# Patient Record
Sex: FEMALE | Race: WHITE
[De-identification: names, ages, dates, MRNs, and addresses within clinical notes are randomized per-mention and may not be internally consistent; named-entity substitution may affect disease eponyms.]

---

## 2018-01-22 ENCOUNTER — HOSPITAL ENCOUNTER (INPATIENT)
Dept: HOSPITAL 68 - SDA | Age: 73
LOS: 2 days | Discharge: HOME HEALTH | DRG: 470 | End: 2018-01-24
Attending: ORTHOPAEDIC SURGERY | Admitting: ORTHOPAEDIC SURGERY
Payer: COMMERCIAL

## 2018-01-22 VITALS — DIASTOLIC BLOOD PRESSURE: 56 MMHG | SYSTOLIC BLOOD PRESSURE: 114 MMHG

## 2018-01-22 VITALS — WEIGHT: 170 LBS | BODY MASS INDEX: 28.32 KG/M2 | HEIGHT: 65 IN

## 2018-01-22 VITALS — SYSTOLIC BLOOD PRESSURE: 128 MMHG | DIASTOLIC BLOOD PRESSURE: 68 MMHG

## 2018-01-22 VITALS — DIASTOLIC BLOOD PRESSURE: 62 MMHG | SYSTOLIC BLOOD PRESSURE: 112 MMHG

## 2018-01-22 VITALS — SYSTOLIC BLOOD PRESSURE: 100 MMHG | DIASTOLIC BLOOD PRESSURE: 60 MMHG

## 2018-01-22 VITALS — DIASTOLIC BLOOD PRESSURE: 54 MMHG | SYSTOLIC BLOOD PRESSURE: 104 MMHG

## 2018-01-22 DIAGNOSIS — I48.91: ICD-10-CM

## 2018-01-22 DIAGNOSIS — K21.9: ICD-10-CM

## 2018-01-22 DIAGNOSIS — I10: ICD-10-CM

## 2018-01-22 DIAGNOSIS — M16.11: Primary | ICD-10-CM

## 2018-01-22 PROCEDURE — 2NBSP: CPT

## 2018-01-22 PROCEDURE — 0SR904A REPLACEMENT OF RIGHT HIP JOINT WITH CERAMIC ON POLYETHYLENE SYNTHETIC SUBSTITUTE, UNCEMENTED, OPEN APPROACH: ICD-10-PCS | Performed by: ORTHOPAEDIC SURGERY

## 2018-01-22 NOTE — SURGICAL DISCHARGE SUMMARY
Visit Information
 
Visit Dates
Admission Date:
01/22/18
 
Discharge Date:
1/24/18
 
 
History of Present Illness
Chief Complaint:
RIGHT HIP PAIN
 
Surgical History
Pertinent Surgical History: unobtainable
Review of Systems:
OK
 
Hospital Course
 
Course
Attending Physician:
Jean Claude Hernandez MD
 
Primary Care Physician:
Edison MORFIN,Qiana KAMINSKI
 
Hospital Course:
PT HAD A RIGHT TOTAL HIP REPLACEMENT ON 1/22/18 BY DR HERNANDEZ. SHE WAS 
AMBULATING WITH PT, VOIDING AND HER PAIN WAS MANAGED WITH ORAL MEDICATIONS.  HER
PRADAXA WAS HELD AND SHE WAS STARTED ON ELIQUIS, WHICH SHE WILL TAKE FOR A TOTAL
OF 2 WEEKS. THEN DR HERNANDEZ WANTS HER TO RESUME HER PRADAXA.  
SHE WAS DEEMED SAFE FOR DISCHARGE TO HOME WITH SERVICES
Allergies:
Coded Allergies:
Cephalosporins (RASH 01/18/18)
procainamide (RASH 01/18/18)
 
 
Disposition Summary
 
Disposition
Principal Diagnosis:
RIGHT HIP OA
Additional Diagnosis:
SEE H AND P
Discharge Disposition: home health services
 
Discharge Instructions
 
General Discharge Information
Code Status: Full Code
Patient's Diet:
REGULAR
Patient's Activity:
WBAT
Follow-Up Instructions/Appts:
TO FU WITH DAVID IN 6 WEEKS FROM DATE OF SURGERY
 
Medications at Discharge
Discharge Medications:
Stop taking the following medications:
Dabigatran Etexilate Mesylate (Pradaxa 150 MG) 150 MG CAPSULE ORAL TWICE DAILY 
 
Cyanocobalamin (Vitamin B-12) (Vitamin B12) 5,000 MCG TAB.RAPDIS ORAL DAILY 
 
Cholecalciferol (Vitamin D3) (Vitamin D) 2,000 UNIT CAPSULE ORAL DAILY 
 
Acetaminophen (Mapap) 500 MG CAPSULE ORAL  as needed for PAIN 
 
Continue taking these medications:
Atorvastatin Calcium (Atorvastatin Calcium) 10 MG TABLET
    1 Tablet ORAL Every night
 
Losartan/Hydrochlorothiazide (Hyzaar 100-12.5 Tablet) 100 MG-12.5 MG TABLET
    1 Tablet ORAL DAILY
 
Levothyroxine Sodium (Levothyroxine Sodium) 150 MCG TABLET
    1 Tablet ORAL DAILY
 
Omega-3 Fatty Acids/Fish Oil (Fish Oil 1,000 MG Capsule) 340 MG-1,000 MG CAPSULE
    1 Capsule ORAL DAILY
 
Famotidine/Ca Carb/Mag Hydrox (Pepcid Complete Tablet Chew) 10 MG-800 MG-165 MG 
TAB.CHEW
    2 Tablet ORAL  as needed for GI
 
Dabigatran Etexilate Mesylate (Pradaxa 150 MG) 150 MG CAPSULE
    1 Capsule ORAL TWICE DAILY
    Instructions:
       HOLD WHILE TAKING ELIQUIS. TO RE-START ON FEBUARY 5, 2018
 
Start taking the following new medications:
Docusate Sodium (Colace) 100 MG CAPSULE
    1 Capsule ORAL TWICE DAILY
    Qty = 14
    No Refills
    Instructions:
       STOP TAKING IF YOU DEVELOP LOOSE STOOL/DIARRHEA.
 
Polyethylene Glycol 3350 (Miralax) 17 GRAM POWD.PACK
    1 Packet ORAL DAILY
    Qty = 7
    No Refills
    Instructions:
       dissolve in water.  STOP TAKING IF YOU DEVELOP LOOSE STOOL/DIARRHEA.
 
Hydromorphone HCl (Dilaudid) 2 MG TABLET
    1-2 Tablet ORAL EVERY 4-6 HOURS AS NEEDED as needed for PAIN
    Qty = 36
    No Refills
    Instructions:
       .
 
Apixaban (Eliquis) 2.5 MG TABLET
    1 Tablet ORAL TWICE DAILY
    Qty = 26
    No Refills
    Instructions:
       TAKE FOR A TOTAL OF 2 WEEKS, TO STOP TAKING ON 2/5/18 AND THEN
       RE-START PRADAXA.
 
Ondansetron (Zofran Odt) 4 MG TAB.RAPDIS
    1 Tablet SUBLINGUAL THREE TIMES DAILY as needed for NAUSEA/VOMITING
    Qty = 15
    No Refills

## 2018-01-22 NOTE — RADIOLOGY REPORT
EXAMINATION:
XR HIP, RIGHT
 
CLINICAL INFORMATION:
Right total hip replacement.
 
COMPARISON:
None.
 
TECHNIQUE:
AP and crosstable lateral views (3 images) of the right hip.
 
FINDINGS:
The right total hip arthroplasty appears well seated in near-anatomic
alignment. There is no evidence of hardware failure, loosening, or fracture.
Mild degenerative changes and trace effusion are identified in the limited
lateral view of the right knee.
 
IMPRESSION:
Intact-appearing right total hip arthroplasty.

## 2018-01-22 NOTE — ADMISSION CORE MEASURES
Acute Coronary Syndrome (CM)
 
ACS Core Measures
Acute Coronary Syndrome Diagnosis No
 
Congestive Heart Failure (NEW)
 
CHF Core Measures
Congestive Heart Failure Diagnosis No
 
Cerebrovascular Accident (NEW)
 
CVA Core Measures
CVA/TIA Diagnosis No
 
Venous Thromboembolism [id number]
 
VTE Core Juliet (View Protocol)
VTE Risk Factors Surgery
No Mechanical VTE Prophylaxis d/t N/A MechProphylax Ordered
No VTE Pharm Prophylaxis d/t NA PharmProphylax ordered
 
Problem List
 
As ranked by this Provider
includes Assessment & Plan
 1. Status post total hip replacement, right
 
 
HOME MEDS
Home Med List
Apixaban (Eliquis) 2.5 MG TABLET   1 TAB PO BID ANTICOAGULATION
Atorvastatin Calcium 10 MG TABLET   1 TAB PO QPM CHOLESTEROL  (Reported)
Dabigatran Etexilate Mesylate (Pradaxa 150 MG) 150 MG CAPSULE   1 CAP PO BID 
AFIB  (Reported)
Docusate Sodium (Colace) 100 MG CAPSULE   1 CAP PO BID STOOL SOFTENER
Famotidine/Ca Carb/Mag Hydrox (Pepcid Complete Tablet Chew) 10 MG-800 MG-165 MG 
TAB.CHEW   2 TAB PO PRN GI  (Reported)
Hydromorphone HCl (Dilaudid) 2 MG TABLET   1-2 TAB PO Q4-6 PRN PRN PAIN
Levothyroxine Sodium 150 MCG TABLET   1 TAB PO DAILY THYROID  (Reported)
Losartan/Hydrochlorothiazide (Hyzaar 100-12.5 Tablet) 100 MG-12.5 MG TABLET   1 
TAB PO DAILY BP  (Reported)
Omega-3 Fatty Acids/Fish Oil (Fish Oil 1,000 MG Capsule) 340 MG-1,000 MG CAPSULE
  1 CAP PO DAILY SUPPLEMENT  (Reported)
Ondansetron (Zofran Odt) 4 MG TAB.RAPDIS   1 TAB SL TID PRN NAUSEA/VOMITING
Polyethylene Glycol 3350 (Miralax) 17 GRAM POWD.PACK   1 PAC PO DAILY 
CONSTIPATION
 
Discontinued Medications
Acetaminophen (Mapap) 500 MG CAPSULE   2 TAB PO PRN PAIN  (Reported)
     Discontinued reason: Med no longer needed
Cholecalciferol (Vitamin D3) (Vitamin D) 2,000 UNIT CAPSULE   1 CAP PO DAILY 
SUPPLEMENT  (Reported)
     Discontinued reason: Per Doctor Decision
Cyanocobalamin (Vitamin B-12) (Vitamin B12) 5,000 MCG TAB.RAPDIS   1 TAB PO 
DAILY SUPPLEMENT  (Reported)
     Discontinued reason: Per Doctor Decision
Dabigatran Etexilate Mesylate (Pradaxa 150 MG) 150 MG CAPSULE   1 CAP PO BID 
AFIB  (Reported)
     Discontinued reason: Per Doctor Decision

## 2018-01-22 NOTE — PN- ORTHOPEDIC
Subjective
Subjective:
POC
 
feeling good, pain well controlled, spinal worn off.  no oob yet- awaiting pt 
eval. due to void postop.  awaiting lunch
 
Objective
Vital Signs and I&Os
Vital Signs
 
 
Date Time Temp Pulse Resp B/P B/P Pulse O2 O2 Flow FiO2
 
     Mean Ox Delivery Rate 
 
01/22 1215 97.8 68 18 128/68  100 Nasal 2.0L 
 
       Cannula  
 
 
 
Physical Exam:
gen- nad
card-  rrr
pulm- no audible wheeze
abd- soft nt 
ext- right hip dressing cdi, ttp at incision,  palp dp, calves soft nt bl, alps 
on, gross sensation intact, +dorsi/plantar flexion bl
 
Assessment/Plan
Assessment/Plan
A- 72F POD0 sp R LALO, PMHx htn, hypothyroid, awaiting pt eval, otherwise stable.
 
P-
- elquis x2 weeks then resume home med pradaxa, alps
- pt, wbat
- due to void postop
- diet as tolerated
- home meds
- dc planning
- will dw attending
 
 
Core Measures
 
Venous Thromboembolism
VTE Risk Factors Surgery
No Mechanical VTE Prophylaxis d/t N/A MechProphylax Ordered
No VTE Pharm Prophylaxis d/t NA PharmProphylax ordered
 
Core Measures
 
Venous Thromboembolism
VTE Risk Factors Surgery
No Mechanical VTE Prophylaxis d/t N/A MechProphylax Ordered
No VTE Pharm Prophylaxis d/t NA PharmProphylax ordered

## 2018-01-22 NOTE — PATIENT DISCHARGE INSTRUCTIONS
Discharge Instructions
 
General Discharge Information
You were seen/treated for:
DEGENERATIVE JOINT DISEASE
You had these procedures:
RIGHT TOTAL HIP REPLACEMENT
Watch for these problems:
FEVER OVER 101
DRAINAGE FROM WOUND
No bath, but you may shower: Yes
Other wound care:
DRY DRESSING CHANGE DAILY
OK TO SHOWER
Special Instructions:
HOLD PRADAXA ON DISCHARGE, TO RESUME TAKING PRADAXA AS PRESCRIBED ON 2/5/18.
TAKE ELIQUIS ON DISCHARGE UNTIL 2/5/18.
 
Diet
Continue normal diet: Yes
 
Activity
Activity Self Limited: Yes
Activity Limited to: Weight bear as tolerated
 
Acute Coronary Syndrome
 
Inclusion Criteria
At DC or during hospital stay patient has or had the following:
ACS DIAGNOSIS No
 
Discharge Core Measures
Meds if any: Prescribed or Continued at Discharge
Meds if any: NOT Prescribed or Continued at Discharge
 
Congestive Heart Failure
 
Inclusion Criteria
At DC or during hospital stay patient has or had the following:
CHF DIAGNOSIS No
 
Discharge Core Measures
Meds if any: Prescribed or Continued at Discharge
Meds if any: NOT Prescribed or Continued at Discharge
 
Cerebrovascular accident
 
Inclusion Criteria
At DC or during hospital stay patient has or had the following:
CVA/TIA Diagnosis No
 
Discharge Core Measures
Meds if any: Prescribed or Continued at Discharge
Meds if any: NOT Prescribed or Continued at Discharge
 
Venous thromboembolism
 
Inclusion Criteria
VTE Diagnosis No
VTE Type NONE
VTE Confirmed by (Test) NONE
 
Discharge Core Measures
- Per Current guidelines, there needs to be overlap
- treatment for the first 5 days of Warfarin therapy.
- If discharged on Warfarin prior to 5 days of
- overlap therapy, the patient will need to be
- assessed for post discharge needs including
- *Post discharge parental anticoagulation
- *Warfarin and/or parental anticoagulation education
- *Follow up date to check INR post discharge
At least 5 days overlap therapy as Inpatient No
Meds if any: Prescribed or Continued at Discharge
Note: Overlap Therapy is Warfarin and Anticoagulant
Meds if any: NOT Prescribed or Continued at Discharge

## 2018-01-22 NOTE — OPERATIVE REPORT
Operative/Inv Procedure Report
Surgery Date: 01/22/18
Name of Procedure:
Right total hip replacement
Pre-Operative Diagnosis:
Primary right hip DJD
Post-Operative Diagnosis:
Same
Estimated Blood Loss: 250
Surgeon/Assistant:
Erna MORFIN,Jean Claude Garcia
Anesthesia: block
 
Operative/Procedure Note
Note:
Description of Procedure:
 
The patient was taken to the operating room and positively identified.  After 
induction of spinal anesthesia and administration of appropriate pre-operative 
antibiotics, the patient was positioned supine on the operating room table and 
all bony prominences were well padded.  After performing a surgical timeout, the
right lower extremity was prepped and draped in the usual sterile fashion.
 
A direct anterior approach was made to the right hip.  The incision was carried 
sharply through superficial soft tissues to the level of the fascia.  Meticulous
hemostasis was maintained with Bovie electocautery.  The fascia over the tensor 
fascia tony muscle was opened sharply and the interval between the TFL and the 
sartorius was entered bluntly taking care to stay lateral to the lateral femoral
cutaneous nerve.  Retractors were placed around the femoral neck and the 
pericapsular fat was identified.  The ascending branches of the lateral femoral 
circumflex vessels were identified and carefully coagulated.  The pericapsular 
fat and anterior capsule were then resected.  A napkin ring osteotomy was 
performed and the femoral head was removed without difficulty.
 
Attention was then turned to the acetabulum.  After appropriate placement of 
retractors, the acetabulum was exposed.  Soft tissue was cleaned from the 
acetabular margin and notch.  Overhanging osteophytes were removed and the 
teardrop was exposed.  The acetabulum was then sequentially reamed to accept a 
54 mm Shabbir Tritanium hemispherical solid shell.  This was impacted into place
in the appropriate position and fitted with a 36 mm Trident X3 zero degree 
polyethylene insert.
 
Attention was then turned to the femur.  After performing the appropriate 
ligament releases, the proximal femur was exposed.  It was then sequentially 
broached to accept a size 6 Shabbir secure fit advanced 127 stem.  This was 
trialed for leg length and stability.  The trial component was removed and the 
final component was impacted into place.  The trunnion was carefully cleaned and
fit with a 36 mm, +0 Biolox delta ceramic femoral head.  The hip was reduced and
put through a full range of motion and found to be stable.
 
The articular space was then irrigated with sterile saline.  The periarticular 
soft tissues were infilitrated with Marcaine.  The fascial layer was closed with
interrupted #1 vicryl suture and the skin was re-approximated with interrupted 2
-0 vicryl.  The skin was closed with a running 3-0 V-Lock suture.  Steri-strips 
and a sterile dressing were applied.  The patient was awakened and taken to the 
recovery room in satisfactory condition.

## 2018-01-23 VITALS — SYSTOLIC BLOOD PRESSURE: 122 MMHG | DIASTOLIC BLOOD PRESSURE: 52 MMHG

## 2018-01-23 VITALS — SYSTOLIC BLOOD PRESSURE: 106 MMHG | DIASTOLIC BLOOD PRESSURE: 62 MMHG

## 2018-01-23 VITALS — DIASTOLIC BLOOD PRESSURE: 58 MMHG | SYSTOLIC BLOOD PRESSURE: 104 MMHG

## 2018-01-23 VITALS — SYSTOLIC BLOOD PRESSURE: 112 MMHG | DIASTOLIC BLOOD PRESSURE: 70 MMHG

## 2018-01-23 LAB
ABSOLUTE GRANULOCYTE CT: 6.4 /CUMM (ref 1.4–6.5)
BASOPHILS # BLD: 0 /CUMM (ref 0–0.2)
BASOPHILS NFR BLD: 0.4 % (ref 0–2)
EOSINOPHIL # BLD: 0.1 /CUMM (ref 0–0.7)
EOSINOPHIL NFR BLD: 1.3 % (ref 0–5)
ERYTHROCYTE [DISTWIDTH] IN BLOOD BY AUTOMATED COUNT: 14.4 % (ref 11.5–14.5)
GRANULOCYTES NFR BLD: 77.3 % (ref 42.2–75.2)
HCT VFR BLD CALC: 31.9 % (ref 37–47)
LYMPHOCYTES # BLD: 1.3 /CUMM (ref 1.2–3.4)
MCH RBC QN AUTO: 27.8 PG (ref 27–31)
MCHC RBC AUTO-ENTMCNC: 33.5 G/DL (ref 33–37)
MCV RBC AUTO: 83 FL (ref 81–99)
MONOCYTES # BLD: 0.4 /CUMM (ref 0.1–0.6)
PLATELET # BLD: 233 /CUMM (ref 130–400)
PMV BLD AUTO: 8.1 FL (ref 7.4–10.4)
RED BLOOD CELL CT: 3.84 /CUMM (ref 4.2–5.4)
WBC # BLD AUTO: 8.3 /CUMM (ref 4.8–10.8)

## 2018-01-23 NOTE — PN- ORTHOPEDIC
Subjective
Subjective:
Patient doing well, pain is controlled, she has been ambulatory to the bathroom,
voided spontaneously, in good spirits, no fever or chills
 
Objective
Vital Signs and I&Os
Vital Signs
 
 
Date Time Temp Pulse Resp B/P B/P Pulse O2 O2 Flow FiO2
 
     Mean Ox Delivery Rate 
 
01/23 0832  74  104/58     
 
01/23 0650 98.2 79 20 106/62  96 Room Air  
 
01/23 0144 98.1 79 20 104/58  93 Room Air  
 
01/22 2200 98.3 75 20 100/60  100 Room Air  
 
01/22 1801 97.5 70 20 112/62  99 Room Air  
 
01/22 1615 96.2 62 20 104/54  99 Room Air Room Air 
 
01/22 1521 97.6 64 18 114/56  99   
 
01/22 1215      100 Nasal 2.0L 
 
       Cannula  
 
01/22 1215 97.8 68 18 128/68  100 Nasal 2.0L 
 
       Cannula  
 
 
 Intake & Output
 
 
 01/23 1600 01/23 0800 01/23 0000 01/22 1600 01/22 0800 01/22 0000
 
Intake Total  780 1430 600  
 
Output Total   1350   
 
Balance  780 80 600  
 
       
 
Intake, IV  300 150   
 
Intake, Oral  480 1280 600  
 
Number   0   
 
Bowel      
 
Movements      
 
Output, Urine   1350   
 
Patient    170 lb  
 
Weight      
 
Weight    Reported by Patient  
 
Measurement      
 
Method      
 
 
 
Physical Exam:
Well-developed well-nourished no apparent distress.
HEENT: Atraumatic, extraocular motion intact
Neck: Supple, no lymphadenopathy
Respiratory: No respiratory distress
Extremities: No edema 
RIGHT lower extremity hip dressing in place, 
Dressing clean dry and intact 
Mild thigh swelling No signs of infection. 
No shortening or rotation
Hip range of motion is limited and without unexpected pain
Neurovascularly intact distally
Bilateral calves are supple, nontender.
Neuro: Alert and oriented x3
Psych: Mood affect normal, normal memory normal judgment.
Skin: Warm and dry, no rash on exposed skin
 
Results
Last 48 Hours of Labs:
 Laboratory Tests
 
 
 01/23
 
 0731
 
Chemistry 
 
  Sodium Pending
 
  Potassium Pending
 
  Chloride Pending
 
  Carbon Dioxide Pending
 
  Anion Gap Pending
 
  BUN Pending
 
  Creatinine Pending
 
  BUN/Creatinine Ratio Pending
 
Hematology 
 
  CBC w Diff Pending
 
  WBC Pending
 
  RBC Pending
 
  Hgb Pending
 
  Hct Pending
 
  MCV Pending
 
  MCH Pending
 
  RDW Pending
 
  Plt Count Pending
 
  MPV Pending
 
  PUBS [facility]CHC Pending
 
 
 
 
Assessment/Plan
Assessment/Plan
Postop day 1 status post right total hip arthroplasty anterior approach
 
Perioperative antibiotics.
Pain medication as needed.
Out of bed 
Physical therapy, weightbearing as tolerated
Regular diet
Follow a.m. labs
Eliquis 2.5 mg by mouth twice a day 2 weeks for DVT prophylaxis, then restart 
Pradaxa for chronic A. fib
ALPS for DVT prophylaxis
Regular home meds
Dressing change postop day 2
Possible discharge home after lunch today with [facility] pending upon pain 
and clearance of physical therapy
 
 
Core Measures
 
Venous Thromboembolism
VTE Risk Factors Surgery
No Mechanical VTE Prophylaxis d/t N/A MechProphylax Ordered
No VTE Pharm Prophylaxis d/t NA PharmProphylax ordered

## 2018-01-24 VITALS — SYSTOLIC BLOOD PRESSURE: 108 MMHG | DIASTOLIC BLOOD PRESSURE: 52 MMHG

## 2018-01-24 VITALS — SYSTOLIC BLOOD PRESSURE: 128 MMHG | DIASTOLIC BLOOD PRESSURE: 72 MMHG
